# Patient Record
Sex: MALE | Race: WHITE | NOT HISPANIC OR LATINO | Employment: OTHER | URBAN - METROPOLITAN AREA
[De-identification: names, ages, dates, MRNs, and addresses within clinical notes are randomized per-mention and may not be internally consistent; named-entity substitution may affect disease eponyms.]

---

## 2013-03-25 LAB — CRC RECOMMENDATION EXT: NORMAL

## 2017-02-02 ENCOUNTER — PATIENT MESSAGE (OUTPATIENT)
Dept: NEUROLOGY | Facility: CLINIC | Age: 62
End: 2017-02-02

## 2017-03-01 PROBLEM — J34.89 RHINORRHEA: Status: ACTIVE | Noted: 2017-03-01

## 2017-03-01 PROBLEM — H57.9 ITCHY EYES: Status: ACTIVE | Noted: 2017-03-01

## 2017-03-01 PROBLEM — J34.89 SINUS PRESSURE: Status: ACTIVE | Noted: 2017-03-01

## 2017-03-01 PROBLEM — R05.9 COUGH: Status: ACTIVE | Noted: 2017-03-01

## 2017-03-01 PROBLEM — R53.83 FATIGUE: Status: ACTIVE | Noted: 2017-03-01

## 2017-03-01 PROBLEM — R06.7 SNEEZING: Status: ACTIVE | Noted: 2017-03-01

## 2017-03-01 PROBLEM — R50.9 FEVER: Status: ACTIVE | Noted: 2017-03-01

## 2017-03-01 PROBLEM — R09.89 CHEST CONGESTION: Status: ACTIVE | Noted: 2017-03-01

## 2017-03-09 PROBLEM — R50.9 FEVER: Status: RESOLVED | Noted: 2017-03-01 | Resolved: 2017-03-09

## 2017-03-09 PROBLEM — H57.9 ITCHY EYES: Status: RESOLVED | Noted: 2017-03-01 | Resolved: 2017-03-09

## 2017-03-09 PROBLEM — J34.89 SINUS PRESSURE: Status: RESOLVED | Noted: 2017-03-01 | Resolved: 2017-03-09

## 2017-03-09 PROBLEM — R06.7 SNEEZING: Status: RESOLVED | Noted: 2017-03-01 | Resolved: 2017-03-09

## 2017-05-15 PROBLEM — R05.9 COUGH: Status: RESOLVED | Noted: 2017-03-01 | Resolved: 2017-05-15

## 2017-05-15 PROBLEM — J34.89 RHINORRHEA: Status: RESOLVED | Noted: 2017-03-01 | Resolved: 2017-05-15

## 2017-05-15 PROBLEM — R09.89 CHEST CONGESTION: Status: RESOLVED | Noted: 2017-03-01 | Resolved: 2017-05-15

## 2018-02-09 PROBLEM — R53.83 FATIGUE: Status: RESOLVED | Noted: 2017-03-01 | Resolved: 2018-02-09

## 2020-03-02 ENCOUNTER — HOSPITAL ENCOUNTER (OUTPATIENT)
Dept: RADIOLOGY | Facility: HOSPITAL | Age: 65
Discharge: HOME | End: 2020-03-02
Attending: NURSE PRACTITIONER
Payer: COMMERCIAL

## 2020-03-02 DIAGNOSIS — M54.42 CHRONIC LEFT-SIDED LOW BACK PAIN WITH LEFT-SIDED SCIATICA: ICD-10-CM

## 2020-03-02 DIAGNOSIS — G89.29 CHRONIC LEFT-SIDED LOW BACK PAIN WITH LEFT-SIDED SCIATICA: ICD-10-CM

## 2020-03-02 DIAGNOSIS — M25.69 DECREASED ROM OF TRUNK AND BACK: ICD-10-CM

## 2020-03-02 DIAGNOSIS — R29.898 WEAKNESS OF LEFT LOWER EXTREMITY: ICD-10-CM

## 2020-03-02 PROCEDURE — 72148 MRI LUMBAR SPINE W/O DYE: CPT | Mod: 26,,, | Performed by: RADIOLOGY

## 2020-03-02 PROCEDURE — 72148 MRI LUMBAR SPINE WITHOUT CONTRAST: ICD-10-PCS | Mod: 26,,, | Performed by: RADIOLOGY

## 2020-03-02 PROCEDURE — 72148 MRI LUMBAR SPINE W/O DYE: CPT | Mod: TC

## 2020-06-03 PROBLEM — M51.36 DDD (DEGENERATIVE DISC DISEASE), LUMBAR: Status: ACTIVE | Noted: 2020-06-03

## 2020-06-03 PROBLEM — J30.2 SEASONAL ALLERGIES: Status: ACTIVE | Noted: 2020-06-03

## 2020-08-14 ENCOUNTER — HOSPITAL ENCOUNTER (OUTPATIENT)
Dept: RADIOLOGY | Facility: HOSPITAL | Age: 65
Discharge: HOME | End: 2020-08-14
Attending: NURSE PRACTITIONER
Payer: COMMERCIAL

## 2020-08-14 DIAGNOSIS — M51.36 DDD (DEGENERATIVE DISC DISEASE), LUMBAR: ICD-10-CM

## 2020-08-14 DIAGNOSIS — G95.9 SPINAL CORD LESION: ICD-10-CM

## 2020-08-14 DIAGNOSIS — M54.42 CHRONIC LEFT-SIDED LOW BACK PAIN WITH LEFT-SIDED SCIATICA: ICD-10-CM

## 2020-08-14 DIAGNOSIS — G89.29 CHRONIC LEFT-SIDED LOW BACK PAIN WITH LEFT-SIDED SCIATICA: ICD-10-CM

## 2020-08-14 PROCEDURE — 72158 MRI LUMBAR SPINE W WO CONTRAST: ICD-10-PCS | Mod: 26,,, | Performed by: RADIOLOGY

## 2020-08-14 PROCEDURE — A9585 GADOBUTROL INJECTION: HCPCS | Performed by: NURSE PRACTITIONER

## 2020-08-14 PROCEDURE — 72158 MRI LUMBAR SPINE W/O & W/DYE: CPT | Mod: TC

## 2020-08-14 PROCEDURE — 72149 MRI LUMBAR SPINE W/DYE: CPT | Mod: TC

## 2020-08-14 PROCEDURE — 72158 MRI LUMBAR SPINE W/O & W/DYE: CPT | Mod: 26,,, | Performed by: RADIOLOGY

## 2020-08-14 PROCEDURE — 25500020 PHARM REV CODE 255: Performed by: NURSE PRACTITIONER

## 2020-08-14 RX ORDER — GADOBUTROL 604.72 MG/ML
9 INJECTION INTRAVENOUS
Status: COMPLETED | OUTPATIENT
Start: 2020-08-14 | End: 2020-08-14

## 2020-08-14 RX ADMIN — GADOBUTROL 9 ML: 604.72 INJECTION INTRAVENOUS at 12:08

## 2020-08-14 RX ADMIN — GADOBUTROL 9 ML: 604.72 INJECTION INTRAVENOUS at 11:08

## 2020-09-04 ENCOUNTER — TELEPHONE (OUTPATIENT)
Dept: SURGERY | Facility: CLINIC | Age: 65
End: 2020-09-04

## 2020-09-18 ENCOUNTER — HOSPITAL ENCOUNTER (OUTPATIENT)
Dept: RADIOLOGY | Facility: HOSPITAL | Age: 65
Discharge: HOME | End: 2020-09-18
Attending: INTERNAL MEDICINE
Payer: COMMERCIAL

## 2020-09-18 PROCEDURE — 72141 MRI CERVICAL SPINE WITHOUT CONTRAST: ICD-10-PCS | Mod: 26,,, | Performed by: RADIOLOGY

## 2020-09-18 PROCEDURE — 76856 US EXAM PELVIC COMPLETE: CPT | Mod: TC

## 2020-09-18 PROCEDURE — 76856 US EXAM PELVIC COMPLETE: CPT | Mod: 26,,, | Performed by: RADIOLOGY

## 2020-09-18 PROCEDURE — 72141 MRI NECK SPINE W/O DYE: CPT | Mod: 26,,, | Performed by: RADIOLOGY

## 2020-09-18 PROCEDURE — 76856 US PELVIS COMPLETE NON OB: ICD-10-PCS | Mod: 26,,, | Performed by: RADIOLOGY

## 2020-09-18 PROCEDURE — 72141 MRI NECK SPINE W/O DYE: CPT | Mod: TC

## 2020-09-18 PROCEDURE — 76700 US EXAM ABDOM COMPLETE: CPT | Mod: 26,,, | Performed by: RADIOLOGY

## 2020-09-18 PROCEDURE — 76700 US EXAM ABDOM COMPLETE: CPT | Mod: TC

## 2020-09-18 PROCEDURE — 76700 US ABDOMEN COMPLETE: ICD-10-PCS | Mod: 26,,, | Performed by: RADIOLOGY

## 2020-09-21 ENCOUNTER — OFFICE VISIT (OUTPATIENT)
Dept: SURGERY | Facility: CLINIC | Age: 65
End: 2020-09-21
Payer: COMMERCIAL

## 2020-09-21 VITALS
RESPIRATION RATE: 18 BRPM | HEIGHT: 69 IN | WEIGHT: 220 LBS | DIASTOLIC BLOOD PRESSURE: 97 MMHG | OXYGEN SATURATION: 98 % | HEART RATE: 70 BPM | BODY MASS INDEX: 32.58 KG/M2 | SYSTOLIC BLOOD PRESSURE: 150 MMHG | TEMPERATURE: 98 F

## 2020-09-21 DIAGNOSIS — K40.90 RIGHT INGUINAL HERNIA: Primary | ICD-10-CM

## 2020-09-21 DIAGNOSIS — E78.2 MIXED HYPERLIPIDEMIA: ICD-10-CM

## 2020-09-21 PROCEDURE — 99204 OFFICE O/P NEW MOD 45 MIN: CPT | Mod: S$GLB,,, | Performed by: SURGERY

## 2020-09-21 PROCEDURE — 3008F PR BODY MASS INDEX (BMI) DOCUMENTED: ICD-10-PCS | Mod: S$GLB,,, | Performed by: SURGERY

## 2020-09-21 PROCEDURE — 3008F BODY MASS INDEX DOCD: CPT | Mod: S$GLB,,, | Performed by: SURGERY

## 2020-09-21 PROCEDURE — 99204 PR OFFICE/OUTPT VISIT, NEW, LEVL IV, 45-59 MIN: ICD-10-PCS | Mod: S$GLB,,, | Performed by: SURGERY

## 2020-09-21 NOTE — PROGRESS NOTES
Subjective:       Patient ID: David Duval Jr.     Chief Complaint:  Consult ([id number])      HPI:  Mr. Duval presents today as a consult from Dr. Choi.  He was sent in consultation for a possible right inguinal hernia.  He is concerned about a possible hernia because his father  from a strangulated inguinal hernia.  He noted a discomfort in his right groin about 1 year ago.  Since that time he has not noted any pain, bulging, nausea, vomiting, diarrhea, constipation, melena, hematochezia, hematemesis, change in bowel habits, change in stool characteristics, decreasing caliber stool, etc.  No other associated symptoms.  No aggravating or alleviating factors.  Colonoscopy in 2013 with no pathology other than diverticulosis detected.      Allergies & Meds:  Review of patient's allergies indicates:  No Known Allergies    Current Outpatient Medications   Medication Sig Dispense Refill    ALPRAZolam (XANAX) 0.25 MG tablet Take 1 tablet (0.25 mg total) by mouth daily as needed for Anxiety (Take 30 mins to 1 hour prior to MRI). 2 tablet 0    ALPRAZolam (XANAX) 0.25 MG tablet Take 1 tablet (0.25 mg total) by mouth 2 (two) times daily as needed for Anxiety. 2 tablet 0    aspirin 81 MG Chew Take 81 mg by mouth once daily.      atorvastatin (LIPITOR) 10 MG tablet Take 1 tablet (10 mg total) by mouth nightly. 90 tablet 3    azelastine-fluticasone (DYMISTA) 137-50 mcg/spray Spry nassal spray 1 spray by Each Nostril route 2 (two) times daily. 23 g 6    brimonidine 0.15 % OPTH DROP (ALPHAGAN) 0.15 % ophthalmic solution INSTILL 1 DROP IN BOTH EYES BID  0    donepezil (ARICEPT) 10 MG tablet Take 10 mg by mouth once daily.   1    dorzolamide (TRUSOPT) 2 % ophthalmic solution Place 1 drop into both eyes 2 (two) times daily.      memantine (NAMENDA XR) 28 mg CSpX Take 28 mg by mouth once daily.   1    montelukast (SINGULAIR) 10 mg tablet Take 1 tablet (10 mg total) by mouth daily as needed. 90  tablet 2    multivitamin (ONE DAILY MULTIVITAMIN) per tablet Take 1 tablet by mouth once daily.      travoprost, benzalkonium, (TRAVATAN) 0.004 % ophthalmic solution Place 1 drop into both eyes every evening.       No current facility-administered medications for this visit.        PMFSHx:  Past Medical History:   Diagnosis Date    GERD (gastroesophageal reflux disease)     Hyperlipemia        Past Surgical History:   Procedure Laterality Date    COLONOSCOPY  3/15/2013    Diverticulosis  Dr. Harley       History reviewed. No pertinent family history.    Social History     Tobacco Use    Smoking status: Never Smoker   Substance Use Topics    Alcohol use: No    Drug use: No       Review of Systems   Constitutional: Negative for appetite change, chills, fatigue, fever and unexpected weight change.   HENT: Negative for congestion, dental problem, ear pain, mouth sores, postnasal drip, rhinorrhea, sore throat, tinnitus, trouble swallowing and voice change.    Eyes: Negative for photophobia, pain, discharge and visual disturbance.   Respiratory: Negative for cough, chest tightness, shortness of breath and wheezing.    Cardiovascular: Negative for chest pain, palpitations and leg swelling.   Gastrointestinal: Negative for abdominal pain, blood in stool, constipation, diarrhea, nausea and vomiting.   Endocrine: Negative for cold intolerance, heat intolerance, polydipsia, polyphagia and polyuria.   Genitourinary: Negative for difficulty urinating, dysuria, flank pain, frequency, hematuria and urgency.   Musculoskeletal: Negative for arthralgias, joint swelling and myalgias.   Skin: Negative for color change and rash.   Allergic/Immunologic: Negative for immunocompromised state.   Neurological: Negative for dizziness, tremors, seizures, syncope, speech difficulty, weakness, numbness and headaches.   Hematological: Negative for adenopathy. Does not bruise/bleed easily.   Psychiatric/Behavioral: Negative for agitation,  confusion, hallucinations, self-injury and suicidal ideas. The patient is not nervous/anxious.             Physical Exam  Constitutional:       General: He is not in acute distress.     Appearance: Normal appearance. He is well-developed. He is not ill-appearing or toxic-appearing.      Comments: Body mass index is 32.49 kg/m².     HENT:      Head: Normocephalic and atraumatic. No contusion.      Right Ear: Hearing and external ear normal. No drainage or tenderness.      Left Ear: Hearing and external ear normal. No drainage or tenderness.      Nose: Nose normal. No rhinorrhea.   Eyes:      General: Lids are normal.         Right eye: No discharge.         Left eye: No discharge.      Conjunctiva/sclera: Conjunctivae normal.      Right eye: Right conjunctiva is not injected. No exudate.     Left eye: Left conjunctiva is not injected. No exudate.     Pupils: Pupils are equal, round, and reactive to light.   Neck:      Musculoskeletal: Normal range of motion. No edema.      Thyroid: No thyroid mass or thyromegaly.      Vascular: No carotid bruit or JVD.      Trachea: Trachea and phonation normal. No tracheal deviation.   Cardiovascular:      Rate and Rhythm: Normal rate and regular rhythm.      Chest Wall: PMI is not displaced.      Heart sounds: Normal heart sounds, S1 normal and S2 normal. No murmur. No friction rub. No gallop.    Pulmonary:      Effort: Pulmonary effort is normal. No tachypnea, accessory muscle usage, respiratory distress or retractions.      Breath sounds: Normal breath sounds.   Chest:      Chest wall: No mass, tenderness or crepitus.      Breasts: Breasts are symmetrical.         Right: No inverted nipple, mass, nipple discharge or skin change.         Left: No inverted nipple, mass, nipple discharge or skin change.   Abdominal:      General: Bowel sounds are normal. There is no distension or abdominal bruit.      Palpations: Abdomen is soft. There is no fluid wave or mass.      Tenderness:  There is no abdominal tenderness.      Hernia: A hernia is present. Hernia is present in the right inguinal area (small, reducible). There is no hernia in the umbilical area, ventral area or left inguinal area.   Genitourinary:     Penis: Normal.       Scrotum/Testes: Normal.         Right: Mass or swelling not present.         Left: Mass or swelling not present.   Musculoskeletal:      Cervical back: Normal.      Thoracic back: Normal.      Lumbar back: Normal.      Right upper arm: Normal.      Left upper arm: Normal.      Right forearm: Normal.      Left forearm: Normal.      Right hand: Normal.      Left hand: Normal.      Right upper leg: Normal.      Left upper leg: Normal.      Right lower leg: Normal.      Left lower leg: Normal.      Right foot: Normal.      Left foot: Normal.   Lymphadenopathy:      Head:      Right side of head: No submental or submandibular adenopathy.      Left side of head: No submental or submandibular adenopathy.      Cervical: No cervical adenopathy.      Upper Body:      Right upper body: No supraclavicular adenopathy.      Left upper body: No supraclavicular adenopathy.      Lower Body: No right inguinal adenopathy. No left inguinal adenopathy.   Skin:     General: Skin is warm and dry.      Findings: No lesion or rash.      Nails: There is no clubbing.     Neurological:      Mental Status: He is alert and oriented to person, place, and time. He is not disoriented.      GCS: GCS eye subscore is 4. GCS verbal subscore is 5. GCS motor subscore is 6.      Cranial Nerves: No cranial nerve deficit.      Sensory: No sensory deficit.      Motor: No tremor or atrophy.      Coordination: Coordination normal.      Gait: Gait normal.   Psychiatric:         Attention and Perception: He is attentive.         Speech: Speech normal.         Behavior: Behavior normal.         Thought Content: Thought content normal.             Medical Records Review:  Pelvic ultrasound films and report reviewed  from 9/2/2020.  No evidence of a hernia detected on the ultrasound however the groins do not appear to have been evaluated and there was no Valsalva maneuver performed.  He does have a enlarged prostate.    Assessment:         1. Right inguinal hernia    2. Mixed hyperlipidemia          Plan:     Right inguinal hernia    Mixed hyperlipidemia  -     Ambulatory referral/consult to General Surgery        Follow up in about 3 months (around 12/21/2020).    Counseling/Medical Decision Making:  David Alvarezmary Duval Jr. was informed of the results of his evaluation thus far and the differential diagnosis.   All treatment options as well as the risks, benefits, possible complications, details up, and indications for each option were also discussed in great detail.   Diagnoses discussed included but were not limited to: indirect inguinal hernia, direct inguinal hernia, femoral hernia, sliding hernia, groin strain, lymphadenopathy, etc.   Options discussed included but were not limited to: laparoscopic hernia surgery, conventional hernia surgery with or without mesh, symptomatic treatment, hernia belt or truss, second opinion, referral elsewhere for treatment, observation, etc.   Possible complications of surgery discussed included but were not limited to: bleeding, infections, recurrence of the hernia(s), scar, chronic pain, nerve damage, scrotal numbness, thigh numbness, impotence, infertility, loss of testicles, need for additional operations or procedures, etc.   Possible complication of an unrepaired hernia discussed included but were not limited to: incarceration, strangulation, necrotic bowel, gangrene, death, bowel obstruction, etc.   Questions were solicited and answered.   Entire conversation was held in layman's terms.   [employer] publication on hernias was provided.   I read the entire operative consent form to him verbatim.   A copy of the consent form was provided for his review outside the clinic and hospital prior  to the procedure.   All unfamiliar terms were to clearly defined.   At the conclusion of the conversation he voiced complete understanding of all we discussed, satisfaction that all questions were answered, and that he felt fully informed and completely capable of making an informed decision.   He desires and requests to proceed with conventional hernia surgery with or without mesh as clinically indicated in January of 2021.  No evident contraindication to to deferring surgery until January as desired.    Total face-to-face encounter time was 45 minutes, 25 minutes spent counseling as outlined/summarized above         The above order for ambulatory referral/consult to General surgery is in the plan section of my note incorrectly.  This is the order from another provider that precipitated this office visit/encounter.  Currently there is an Epic software error that automatically pulls the referral diagnosis from the requesting provider into the plan section of this encounter note.  This order should be disregarded as part of this note/encounter.

## 2020-09-21 NOTE — LETTER
September 21, 2020      Prabhakar Choi III, MD  [address]  [facility] [place] [address]           [facility] - General Surgery  [address] [facility] [place] [address]  Phone: [phone]  Fax: [phone]          Patient: David Duval Jr.   MR Number: [id number]   YOB: 1955   Date of Visit: 9/21/2020       Dear Dr. Prabhakar Choi III:    Thank you for referring David Duval to me for evaluation. Attached you will find relevant portions of my assessment and plan of care.    If you have questions, please do not hesitate to call me. I look forward to following David Duval along with you.    Sincerely,    Prateek White MD    Enclosure  CC:  No Recipients    If you would like to receive this communication electronically, please contact [email or web address] or [phone] to request more information on [employer] access.    For providers and/or their staff who would like to refer a patient to [facility], please contact us through our one-stop-shop provider referral line, [facility], at [phone].    If you feel you have received this communication in error or would no longer like to receive these types of communications, please e-mail [email or web address]

## 2020-09-24 ENCOUNTER — TELEPHONE (OUTPATIENT)
Dept: PHYSICAL MEDICINE AND REHAB | Facility: CLINIC | Age: 65
End: 2020-09-24

## 2020-10-13 ENCOUNTER — OFFICE VISIT (OUTPATIENT)
Dept: PHYSICAL MEDICINE AND REHAB | Facility: CLINIC | Age: 65
End: 2020-10-13
Payer: COMMERCIAL

## 2020-10-13 ENCOUNTER — HOSPITAL ENCOUNTER (OUTPATIENT)
Dept: RADIOLOGY | Facility: HOSPITAL | Age: 65
Discharge: HOME | End: 2020-10-13
Attending: PHYSICAL MEDICINE & REHABILITATION
Payer: COMMERCIAL

## 2020-10-13 VITALS
BODY MASS INDEX: 32.58 KG/M2 | RESPIRATION RATE: 15 BRPM | HEART RATE: 72 BPM | DIASTOLIC BLOOD PRESSURE: 98 MMHG | WEIGHT: 220 LBS | SYSTOLIC BLOOD PRESSURE: 155 MMHG | HEIGHT: 69 IN

## 2020-10-13 DIAGNOSIS — M79.641 PAIN OF RIGHT HAND: Primary | ICD-10-CM

## 2020-10-13 DIAGNOSIS — M79.641 PAIN OF RIGHT HAND: ICD-10-CM

## 2020-10-13 PROCEDURE — 73120 X-RAY EXAM OF HAND: CPT | Mod: TC,FY,RT

## 2020-10-13 PROCEDURE — 99999 PR PBB SHADOW E&M-EST. PATIENT-LVL IV: CPT | Mod: PBBFAC,,, | Performed by: PHYSICAL MEDICINE & REHABILITATION

## 2020-10-13 PROCEDURE — 99204 OFFICE O/P NEW MOD 45 MIN: CPT | Mod: S$GLB,,, | Performed by: PHYSICAL MEDICINE & REHABILITATION

## 2020-10-13 PROCEDURE — 73120 XR HAND 2 VIEW RIGHT: ICD-10-PCS | Mod: 26,RT,, | Performed by: RADIOLOGY

## 2020-10-13 PROCEDURE — 99999 PR PBB SHADOW E&M-EST. PATIENT-LVL IV: ICD-10-PCS | Mod: PBBFAC,,, | Performed by: PHYSICAL MEDICINE & REHABILITATION

## 2020-10-13 PROCEDURE — 73120 X-RAY EXAM OF HAND: CPT | Mod: 26,RT,, | Performed by: RADIOLOGY

## 2020-10-13 PROCEDURE — 99204 PR OFFICE/OUTPT VISIT, NEW, LEVL IV, 45-59 MIN: ICD-10-PCS | Mod: S$GLB,,, | Performed by: PHYSICAL MEDICINE & REHABILITATION

## 2020-10-13 NOTE — PROGRESS NOTES
HPI:  Patient is a 64 y.o. year old male w. Right hand pain. He has been having difficulty grasping his coffee cup because of  Weakness. He noticed this over 3 months ago. He states the base of his thumb is the most painful. The pain comes and goes. It does not happen at night while he is resting his hand. He does complain of weakness. His mri of the cervical spine ordered by his pcp did show abnormalities, see below.  He does admit to nerve injury to his left hand when he was 5 yrs old, after falling on a broken soda bottle. This left him w. A weak hand on his left side.  However, he has never had problems of his right side until now.    Imaging  MRI Cervical Spine Without Contrast  Order: [id number]  Status:  Final result   Visible to patient:  Yes (Patient Portal) Next appt:  Today at 10:30 AM in Radiology (Portable X-Ray) Dx:  Other spondylosis, cervical region  Details    Reading Physician Reading Date Result Priority   Bryan Love MD  [phone] 9/18/2020 Routine      Narrative & Impression     EXAMINATION:  MRI CERVICAL SPINE WITHOUT CONTRAST     CLINICAL HISTORY:  Cervical osteoarthritis;right upper ext weakness;.  Other spondylosis, cervical region     TECHNIQUE:  Multiplanar, multisequence MR images of the cervical spine were acquired without the administration of contrast.     COMPARISON:  None.     FINDINGS:  Mild straightening the normal cervical lordosis.  Minimal grade 1 retrolisthesis of C6 on C7.  Remaining cervical vertebral bodies are normal in height and alignment.  No acute fracture.  No marrow edema.     Spinal cord is normal in course and signal intensity.  No MRI evidence for marrow edema or myelomalacia.     No significant prevertebral soft tissue swelling.  Paraspinal soft tissues are unremarkable.     C2-C3: Asymmetric uncovertebral and facet joint hypertrophy results in mild narrowing of the right neural foramen and moderate narrowing the left neural foramen.  No central spinal  canal stenosis.     C3-C4: Broad-based disc bulging with a small left paracentral focal disc protrusion.  This in association with asymmetric uncovertebral and facet joint hypertrophy results in moderate central spinal canal stenosis with moderate narrowing of the right neural foramen and severe narrowing of the left neural foramen.  Narrowed AP canal diameter measures 7.4 mm.     C4-C5: Broad-based disc bulging with uncovertebral and facet joint hypertrophy results in mild central spinal canal stenosis with moderate bilateral neural foraminal narrowing.  Narrowed AP canal diameter measures 9.2 mm.     C5-C6: Broad-based disc bulging with uncovertebral and facet joint hypertrophy results in mild central spinal canal stenosis with severe bilateral neural foraminal narrowing.  Narrowed AP canal diameter measures 9.3 mm.     C6-C7: Grade 1 retrolisthesis.  Broad-based disc bulging with uncovertebral and facet joint hypertrophy results in mild central spinal canal stenosis with severe bilateral neural foraminal narrowing.  Narrowed AP canal diameter measures 9.2 mm.     C7-T1: Uncovertebral and facet joint hypertrophy results in moderate narrowing of the right neural foramen and severe narrowing the left neural foramen.  No central spinal canal stenosis.     Impression:     1. Grade 1 retrolisthesis of C6 on C7.  2. Degenerative disc disease at C2-C3 resulting in mild to moderate neural foraminal narrowing.  3. Degenerative disc disease at C3-C4 with a left paracentral focal disc protrusion resulting in moderate central spinal canal stenosis with moderate to severe neural foraminal narrowing.  4. Multilevel degenerative disc disease from C4 through C7 resulting in mild central spinal canal stenosis with moderate to severe neural foraminal narrowing.  5. Degenerative disc disease at C7-T1 resulting in moderate to severe neural foraminal narrowing.           Labs  egfr cr lfts gluc nl      Past Medical History:    Diagnosis Date    GERD (gastroesophageal reflux disease)     Hyperlipemia      Past Surgical History:   Procedure Laterality Date    COLONOSCOPY  3/15/2013    Diverticulosis  Dr. Harley     No family history on file.  Social History     Socioeconomic History    Marital status:      Spouse name: Not on file    Number of children: Not on file    Years of education: Not on file    Highest education level: Not on file   Occupational History    Not on file   Social Needs    Financial resource strain: Not on file    Food insecurity     Worry: Not on file     Inability: Not on file    Transportation needs     Medical: Not on file     Non-medical: Not on file   Tobacco Use    Smoking status: Never Smoker   Substance and Sexual Activity    Alcohol use: No    Drug use: No    Sexual activity: Not on file   Lifestyle    Physical activity     Days per week: Not on file     Minutes per session: Not on file    Stress: Not on file   Relationships    Social connections     Talks on phone: Not on file     Gets together: Not on file     Attends Yazidism service: Not on file     Active member of club or organization: Not on file     Attends meetings of clubs or organizations: Not on file     Relationship status: Not on file   Other Topics Concern    Not on file   Social History Narrative    Not on file       Review of patient's allergies indicates:  No Known Allergies    Current Outpatient Medications:     ALPRAZolam (XANAX) 0.25 MG tablet, Take 1 tablet (0.25 mg total) by mouth daily as needed for Anxiety (Take 30 mins to 1 hour prior to MRI)., Disp: 2 tablet, Rfl: 0    aspirin 81 MG Chew, Take 81 mg by mouth once daily., Disp: , Rfl:     atorvastatin (LIPITOR) 10 MG tablet, Take 1 tablet (10 mg total) by mouth nightly., Disp: 90 tablet, Rfl: 3    azelastine-fluticasone (DYMISTA) 137-50 mcg/spray Spry nassal spray, 1 spray by Each Nostril route 2 (two) times daily., Disp: 23 g, Rfl: 6    brimonidine  0.15 % OPTH DROP (ALPHAGAN) 0.15 % ophthalmic solution, INSTILL 1 DROP IN BOTH EYES BID, Disp: , Rfl: 0    donepezil (ARICEPT) 10 MG tablet, Take 10 mg by mouth once daily. , Disp: , Rfl: 1    dorzolamide (TRUSOPT) 2 % ophthalmic solution, Place 1 drop into both eyes 2 (two) times daily., Disp: , Rfl:     memantine (NAMENDA XR) 28 mg CSpX, Take 28 mg by mouth once daily. , Disp: , Rfl: 1    montelukast (SINGULAIR) 10 mg tablet, Take 1 tablet (10 mg total) by mouth daily as needed., Disp: 90 tablet, Rfl: 2    multivitamin (ONE DAILY MULTIVITAMIN) per tablet, Take 1 tablet by mouth once daily., Disp: , Rfl:     travoprost, benzalkonium, (TRAVATAN) 0.004 % ophthalmic solution, Place 1 drop into both eyes every evening., Disp: , Rfl:       Review of Systems:    No nausea, vomiting, fevers, chills , contipation, diarrhea or sweats,no weight change, occ neck stiffness, no chest pain, no sob, no change of bowel or bladder habits,no coordination issues      Physical Exam:      Vitals:    10/13/20 0919   BP: (!) 155/98   Pulse: 72   Resp: 15     alert and oriented ×4 follows commands answers all questions appropriately,affect wnl  Manual muscle test 5 out of 5 except for left HI and opponens pollicis on left hand (baseline from nerve injury as a child) sensation to light touch grossly intact  -CMC provocation  Nl gait  -spurling's  -guillermo's  Full cervical ROM  babinsky down  No clonus  DTR's symmetric 2+  No C/C/E  -finkelstein's  No muscular atrophy    Assessment:  Hand/thumb pain d/t cervical radic/ vs peripheral nerve entrapment vs CMC OA (less likely)  Cervical spondylosis w. Severe neuroforaminal narrowing at multiple levels    Plan:  Will order an EMG/NCS of the RUE   He will also get a right hand xray  rtc 2wks    Thank you for this interesting referral-note will be sent via Epic to referring provider (Dr. Choi)

## 2020-10-27 ENCOUNTER — OFFICE VISIT (OUTPATIENT)
Dept: PHYSICAL MEDICINE AND REHAB | Facility: CLINIC | Age: 65
End: 2020-10-27
Payer: COMMERCIAL

## 2020-10-27 VITALS
BODY MASS INDEX: 32.58 KG/M2 | HEIGHT: 69 IN | SYSTOLIC BLOOD PRESSURE: 151 MMHG | DIASTOLIC BLOOD PRESSURE: 96 MMHG | WEIGHT: 220 LBS

## 2020-10-27 DIAGNOSIS — M25.541 ARTHRALGIA OF RIGHT HAND: Primary | ICD-10-CM

## 2020-10-27 DIAGNOSIS — M79.641 PAIN OF RIGHT HAND: ICD-10-CM

## 2020-10-27 PROCEDURE — 99212 PR OFFICE/OUTPT VISIT, EST, LEVL II, 10-19 MIN: ICD-10-PCS | Mod: S$GLB,,, | Performed by: PHYSICAL MEDICINE & REHABILITATION

## 2020-10-27 PROCEDURE — 3008F BODY MASS INDEX DOCD: CPT | Mod: CPTII,S$GLB,, | Performed by: PHYSICAL MEDICINE & REHABILITATION

## 2020-10-27 PROCEDURE — 95886 MUSC TEST DONE W/N TEST COMP: CPT | Mod: S$GLB,,, | Performed by: PHYSICAL MEDICINE & REHABILITATION

## 2020-10-27 PROCEDURE — 95910 NRV CNDJ TEST 7-8 STUDIES: CPT | Mod: S$GLB,,, | Performed by: PHYSICAL MEDICINE & REHABILITATION

## 2020-10-27 PROCEDURE — 99499 NO LOS: ICD-10-PCS | Mod: S$GLB,,, | Performed by: PHYSICAL MEDICINE & REHABILITATION

## 2020-10-27 PROCEDURE — 95886 PR EMG COMPLETE, W/ NERVE CONDUCTION STUDIES, 5+ MUSCLES: ICD-10-PCS | Mod: S$GLB,,, | Performed by: PHYSICAL MEDICINE & REHABILITATION

## 2020-10-27 PROCEDURE — 99499 UNLISTED E&M SERVICE: CPT | Mod: S$GLB,,, | Performed by: PHYSICAL MEDICINE & REHABILITATION

## 2020-10-27 PROCEDURE — 3008F PR BODY MASS INDEX (BMI) DOCUMENTED: ICD-10-PCS | Mod: CPTII,S$GLB,, | Performed by: PHYSICAL MEDICINE & REHABILITATION

## 2020-10-27 PROCEDURE — 99999 PR PBB SHADOW E&M-EST. PATIENT-LVL III: ICD-10-PCS | Mod: PBBFAC,,, | Performed by: PHYSICAL MEDICINE & REHABILITATION

## 2020-10-27 PROCEDURE — 95910 PR NERVE CONDUCTION STUDY; 7-8 STUDIES: ICD-10-PCS | Mod: S$GLB,,, | Performed by: PHYSICAL MEDICINE & REHABILITATION

## 2020-10-27 PROCEDURE — 99212 OFFICE O/P EST SF 10 MIN: CPT | Mod: S$GLB,,, | Performed by: PHYSICAL MEDICINE & REHABILITATION

## 2020-10-27 PROCEDURE — 99999 PR PBB SHADOW E&M-EST. PATIENT-LVL III: CPT | Mod: PBBFAC,,, | Performed by: PHYSICAL MEDICINE & REHABILITATION

## 2020-10-27 RX ORDER — DICLOFENAC SODIUM 10 MG/G
4 GEL TOPICAL 3 TIMES DAILY
Qty: 2 TUBE | Refills: 6 | Status: SHIPPED | OUTPATIENT
Start: 2020-10-27 | End: 2023-04-12

## 2020-10-27 NOTE — PROGRESS NOTES
[facility]  Physical Medicine and Rehabilitation   [address] [place] [address], [address]  [place] [address]             Patient: David Duval   Patient ID: [id number]   Sex:     Date of Birth:     Age:     Notes:     Last visit date: 10/27/2020         Visit date and time: 10/27/2020 07:10   Patient Age on Visit Date:     Referring Physician:     Diagnoses:         Right thumb pain  Sensory NCS      Nerve / Sites Rec. Site Onset Lat Peak Lat Ref. NP Amp Ref. PP Amp Ref. Segments Distance Velocity     ms ms ms µV µV µV µV  cm m/s   R Median - Digit II (Antidromic)      Wrist Dig II 2.76 3.44 ?3.40 8.9 ?15.0 13.4 ?20.0 Wrist - Dig II 13 47   R Ulnar - Digit V (Antidromic)      Wrist Dig V 2.08 2.97 ?3.10 13.4 ?10.0 19.4 ?15.0 Wrist - Dig V 11 53       Motor NCS      Nerve / Sites Muscle Latency Ref. Amplitude Ref. Amp % Duration Segments Distance Lat Diff Velocity Ref.     ms ms mV mV % ms  cm ms m/s m/s   R Median - APB      Wrist APB 2.92 ?4.40 11.2 ?4.0 100 7.14 Wrist - APB 7         Elbow APB 8.39  5.8  51.9 6.67 Elbow - Wrist 25 5.47 46 ?49   R Ulnar - ADM      Wrist ADM 2.60 ?3.60 6.8 ?5.0 100 7.08 Wrist - ADM 7         B.Elbow ADM 7.14  6.6  96.9 8.23 B.Elbow - Wrist 24.5 4.53 54 ?49      A.Elbow ADM 9.17  6.4  94.5 8.23 A.Elbow - B.Elbow 10 2.03 49 ?49       EMG Summary Table     Spontaneous MUAP Recruitment   Muscle IA Fib PSW Fasc H.F. Amp Dur. PPP Pattern   R. Biceps brachii N None None None None N N N N   R. Deltoid N None None None None N N N N   R. Triceps brachii N None None None None N N N N   R. Extensor carpi radialis brevis N None None None None N N N N   R. First dorsal interosseous N None None None None N N N N       Summary    The motor conduction test was performed on 2 nerve(s). The results were normal in 1 nerve(s): R Ulnar - ADM. Results outside the specified normal range were found in 1 nerve(s), as follows:   In the R Median - APB study  o the take off velocity result was  reduced for Elbow - Wrist segment    The sensory conduction test was performed on 2 nerve(s). The results were normal in 1 nerve(s): R Ulnar - Digit V (Antidromic). Results outside the specified normal range were found in 1 nerve(s), as follows:   In the R Median - Digit II (Antidromic) study  o the peak latency result was increased for Wrist stimulation  o the peak amplitude result was reduced for Wrist stimulation    The needle EMG study was normal in all 5 tested muscles: R. Biceps brachii, R. Deltoid, R. Triceps brachii, R. Extensor carpi radialis brevis, R. First dorsal interosseous.          Conclusion:     Moderate right carpal tunnel syndrome  NO electrophysiologic evidence of cervical radiculopathy          ____________________________  Lilly Betancourt D.O.

## 2020-10-27 NOTE — PROGRESS NOTES
HPI:  Patient is a 64 y.o. year old male w. Right mod cts as diagnosed via EMG. His xray of his hand showed mild djd. He has discomfort when picking up a large dringing jug, otherwise he is asymptomatic. He has occasional neck pain.    Imaging  X-Ray Hand 2 View Right  Order: [id number]  Status:  Final result   Visible to patient:  Yes (Patient Portal)   Next appt:  11/24/2020 at 08:45 AM in Internal Medicine (Prabhakar Choi III, MD)   Dx:  Pain of right hand  Details    Reading Physician Reading Date Result Priority   Hyacinth Ocampo MD  [phone] 10/13/2020 Routine      Narrative & Impression     EXAMINATION:  XR HAND 2 VIEW RIGHT     CLINICAL HISTORY:  Pain in right hand     TECHNIQUE:  Two views AP and lateral of the right hand     COMPARISON:  None     FINDINGS:  The right hand is visualized on the two views appears intact without acute fracture or dislocation.  Mild degenerative changes more so at the carpal 1st metacarpal joint.     Impression:     As above.         MRI Cervical Spine Without Contrast  Order: [id number]  Status:  Final result   Visible to patient:  Yes (Patient Portal)   Next appt:  11/24/2020 at 08:45 AM in Internal Medicine (Prabhakar Choi III, MD)   Dx:  Other spondylosis, cervical region  Details    Reading Physician Reading Date Result Priority   Bryan Love MD  [phone] 9/18/2020 Routine      Narrative & Impression     EXAMINATION:  MRI CERVICAL SPINE WITHOUT CONTRAST     CLINICAL HISTORY:  Cervical osteoarthritis;right upper ext weakness;.  Other spondylosis, cervical region     TECHNIQUE:  Multiplanar, multisequence MR images of the cervical spine were acquired without the administration of contrast.     COMPARISON:  None.     FINDINGS:  Mild straightening the normal cervical lordosis.  Minimal grade 1 retrolisthesis of C6 on C7.  Remaining cervical vertebral bodies are normal in height and alignment.  No acute fracture.  No marrow edema.     Spinal cord is normal in course  and signal intensity.  No MRI evidence for marrow edema or myelomalacia.     No significant prevertebral soft tissue swelling.  Paraspinal soft tissues are unremarkable.     C2-C3: Asymmetric uncovertebral and facet joint hypertrophy results in mild narrowing of the right neural foramen and moderate narrowing the left neural foramen.  No central spinal canal stenosis.     C3-C4: Broad-based disc bulging with a small left paracentral focal disc protrusion.  This in association with asymmetric uncovertebral and facet joint hypertrophy results in moderate central spinal canal stenosis with moderate narrowing of the right neural foramen and severe narrowing of the left neural foramen.  Narrowed AP canal diameter measures 7.4 mm.     C4-C5: Broad-based disc bulging with uncovertebral and facet joint hypertrophy results in mild central spinal canal stenosis with moderate bilateral neural foraminal narrowing.  Narrowed AP canal diameter measures 9.2 mm.     C5-C6: Broad-based disc bulging with uncovertebral and facet joint hypertrophy results in mild central spinal canal stenosis with severe bilateral neural foraminal narrowing.  Narrowed AP canal diameter measures 9.3 mm.     C6-C7: Grade 1 retrolisthesis.  Broad-based disc bulging with uncovertebral and facet joint hypertrophy results in mild central spinal canal stenosis with severe bilateral neural foraminal narrowing.  Narrowed AP canal diameter measures 9.2 mm.     C7-T1: Uncovertebral and facet joint hypertrophy results in moderate narrowing of the right neural foramen and severe narrowing the left neural foramen.  No central spinal canal stenosis.     Impression:     1. Grade 1 retrolisthesis of C6 on C7.  2. Degenerative disc disease at C2-C3 resulting in mild to moderate neural foraminal narrowing.  3. Degenerative disc disease at C3-C4 with a left paracentral focal disc protrusion resulting in moderate central spinal canal stenosis with moderate to severe neural  foraminal narrowing.  4. Multilevel degenerative disc disease from C4 through C7 resulting in mild central spinal canal stenosis with moderate to severe neural foraminal narrowing.  5. Degenerative disc disease at C7-T1 resulting in moderate to severe neural foraminal narrowing.              Labs  egfr cr lfts gluc nl        Past Medical History:   Diagnosis Date    GERD (gastroesophageal reflux disease)     Hyperlipemia      Past Surgical History:   Procedure Laterality Date    COLONOSCOPY  3/15/2013    Diverticulosis  Dr. Harley     No family history on file.  Social History     Socioeconomic History    Marital status:      Spouse name: Not on file    Number of children: Not on file    Years of education: Not on file    Highest education level: Not on file   Occupational History    Not on file   Social Needs    Financial resource strain: Not on file    Food insecurity     Worry: Not on file     Inability: Not on file    Transportation needs     Medical: Not on file     Non-medical: Not on file   Tobacco Use    Smoking status: Never Smoker   Substance and Sexual Activity    Alcohol use: No    Drug use: No    Sexual activity: Not on file   Lifestyle    Physical activity     Days per week: Not on file     Minutes per session: Not on file    Stress: Not on file   Relationships    Social connections     Talks on phone: Not on file     Gets together: Not on file     Attends Latter day service: Not on file     Active member of club or organization: Not on file     Attends meetings of clubs or organizations: Not on file     Relationship status: Not on file   Other Topics Concern    Not on file   Social History Narrative    Not on file       Review of patient's allergies indicates:  No Known Allergies    Current Outpatient Medications:     ALPRAZolam (XANAX) 0.25 MG tablet, Take 1 tablet (0.25 mg total) by mouth daily as needed for Anxiety (Take 30 mins to 1 hour prior to MRI)., Disp: 2 tablet, Rfl:  0    aspirin 81 MG Chew, Take 81 mg by mouth once daily., Disp: , Rfl:     atorvastatin (LIPITOR) 10 MG tablet, Take 1 tablet (10 mg total) by mouth nightly., Disp: 90 tablet, Rfl: 3    azelastine-fluticasone (DYMISTA) 137-50 mcg/spray Spry nassal spray, 1 spray by Each Nostril route 2 (two) times daily., Disp: 23 g, Rfl: 6    brimonidine 0.15 % OPTH DROP (ALPHAGAN) 0.15 % ophthalmic solution, INSTILL 1 DROP IN BOTH EYES BID, Disp: , Rfl: 0    donepezil (ARICEPT) 10 MG tablet, Take 10 mg by mouth once daily. , Disp: , Rfl: 1    dorzolamide (TRUSOPT) 2 % ophthalmic solution, Place 1 drop into both eyes 2 (two) times daily., Disp: , Rfl:     memantine (NAMENDA XR) 28 mg CSpX, Take 28 mg by mouth once daily. , Disp: , Rfl: 1    montelukast (SINGULAIR) 10 mg tablet, Take 1 tablet (10 mg total) by mouth daily as needed., Disp: 90 tablet, Rfl: 2    multivitamin (ONE DAILY MULTIVITAMIN) per tablet, Take 1 tablet by mouth once daily., Disp: , Rfl:     travoprost, benzalkonium, (TRAVATAN) 0.004 % ophthalmic solution, Place 1 drop into both eyes every evening., Disp: , Rfl:     diclofenac sodium (VOLTAREN) 1 % Gel, Apply 4 g topically 3 (three) times daily., Disp: 2 Tube, Rfl: 6      Review of Systems  No nausea, vomiting, fevers, Chills , contipation, diarrhea or sweats    Physical Exam:      Vitals:    10/27/20 0744   BP: (!) 151/96     alert and oriented ×4 follows commands answers all questions appropriately,affect wnl  Manual muscle test 5 out of 5 except for left HI and opponens pollicis on left hand (baseline from nerve injury as a child) sensation to light touch grossly intact  -CMC provocation  Nl gait  Full cervical ROM  No C/C/E  No muscular atrophy- including hypothenar and thenar eminence     Assessment:  Mod CTS  Mild CMC OA   Cervical spondylosis w. Severe neuroforaminal narrowing at multiple levels     Plan:  He declined referral to ortho. He is not interested in surgery as he does not have a lot of  pain. He is aggreable in wearing a carpal tunnel splint at night.  I have also issued a prescription of voltaren gel for him to use for his hand OA  rtc prn

## 2021-08-11 PROBLEM — G56.01 CARPAL TUNNEL SYNDROME, RIGHT: Status: ACTIVE | Noted: 2021-08-11

## 2022-03-23 ENCOUNTER — PATIENT OUTREACH (OUTPATIENT)
Dept: ADMINISTRATIVE | Facility: HOSPITAL | Age: 67
End: 2022-03-23
Payer: MEDICARE

## 2022-03-23 NOTE — PROGRESS NOTES
[facility] CMS chart audits, COLON CANCER SCREENING. Chart review completed for  test overdue (mammograms, Colonoscopies, pap smears, DM labs, and/or EYE EXAMs)      Care Everywhere and media, updates requested and reviewed.        HM updated with external colonoscopy report from media.

## 2022-07-07 NOTE — TELEPHONE ENCOUNTER
Returned call. Appointment scheduled.     ----- Message from Denice Saavedra sent at 9/4/2020 10:53 AM CDT -----  Regarding: PT  Contact: PT  PT called to schedule an appointment, has a referral in his chart. Please call back     Callback: [phone]       7

## 2023-04-18 ENCOUNTER — HOSPITAL ENCOUNTER (OUTPATIENT)
Dept: RADIOLOGY | Facility: HOSPITAL | Age: 68
Discharge: HOME | End: 2023-04-18
Payer: MEDICARE

## 2023-04-18 ENCOUNTER — HOSPITAL ENCOUNTER (OUTPATIENT)
Dept: RADIOLOGY | Facility: HOSPITAL | Age: 68
Discharge: HOME | End: 2023-04-18
Attending: RADIOLOGY
Payer: MEDICARE

## 2023-04-18 DIAGNOSIS — R29.90 NEUROLOGICAL SYMPTOMS: ICD-10-CM

## 2023-04-18 DIAGNOSIS — S00.259A METAL FOREIGN BODY IN EYE REGION: ICD-10-CM

## 2023-04-18 PROCEDURE — 70551 MRI BRAIN STEM W/O DYE: CPT | Mod: 26,,, | Performed by: RADIOLOGY

## 2023-04-18 PROCEDURE — 70551 MRI BRAIN STEM W/O DYE: CPT | Mod: TC

## 2023-04-18 PROCEDURE — 70551 MRI BRAIN WITHOUT CONTRAST: ICD-10-PCS | Mod: 26,,, | Performed by: RADIOLOGY

## 2023-06-01 PROBLEM — I10 PRIMARY HYPERTENSION: Status: ACTIVE | Noted: 2023-06-01

## 2024-02-13 ENCOUNTER — ANESTHESIA EVENT (OUTPATIENT)
Dept: SURGERY | Facility: HOSPITAL | Age: 69
End: 2024-02-13
Payer: MEDICARE

## 2024-02-13 NOTE — ANESTHESIA PREPROCEDURE EVALUATION
02/13/2024  David Duval Jr. is a 68 y.o., male for  COLONOSCOPY      has a past surgical history that includes Colonoscopy (3/15/2013) and Inguinal hernia repair (Right, 07/22/2021).       Pre-op Assessment    I have reviewed the Patient Summary Reports.     I have reviewed the Nursing Notes. I have reviewed the NPO Status.   I have reviewed the Medications.     Review of Systems  Anesthesia Hx:             Denies Family Hx of Anesthesia complications.    Denies Personal Hx of Anesthesia complications.                    Social:  Non-Smoker       Cardiovascular:     Hypertension           hyperlipidemia                             Pulmonary:        Sleep Apnea                Hepatic/GI:     GERD             Musculoskeletal:  Arthritis               Neurological:    Neuromuscular Disease,       Right carpal tunnel syndrome  Memory difficulties                                Physical Exam  General: Well nourished, Cooperative, Alert and Oriented    Airway:  Mallampati: II   Mouth Opening: Normal  TM Distance: Normal  Tongue: Normal  Neck ROM: Normal ROM    Dental:  Intact    Chest/Lungs:  Clear to auscultation, Normal Respiratory Rate    Heart:  Rate: Normal  Rhythm: Regular Rhythm        Anesthesia Plan  Type of Anesthesia, risks & benefits discussed:    Anesthesia Type: MAC  Intra-op Monitoring Plan: Standard ASA Monitors  Post Op Pain Control Plan: IV/PO Opioids PRN  Induction:  IV  Informed Consent: Informed consent signed with the Patient and all parties understand the risks and agree with anesthesia plan.  All questions answered.   ASA Score: 2  Day of Surgery Review of History & Physical: H&P Update referred to the surgeon/provider.    Ready For Surgery From Anesthesia Perspective.     .

## 2024-02-16 ENCOUNTER — HOSPITAL ENCOUNTER (OUTPATIENT)
Facility: HOSPITAL | Age: 69
Discharge: HOME | End: 2024-02-16
Attending: INTERNAL MEDICINE | Admitting: INTERNAL MEDICINE
Payer: MEDICARE

## 2024-02-16 ENCOUNTER — ANESTHESIA (OUTPATIENT)
Dept: SURGERY | Facility: HOSPITAL | Age: 69
End: 2024-02-16
Payer: MEDICARE

## 2024-02-16 VITALS
BODY MASS INDEX: 34.07 KG/M2 | OXYGEN SATURATION: 99 % | SYSTOLIC BLOOD PRESSURE: 140 MMHG | WEIGHT: 230 LBS | TEMPERATURE: 97 F | DIASTOLIC BLOOD PRESSURE: 96 MMHG | RESPIRATION RATE: 20 BRPM | HEART RATE: 81 BPM | HEIGHT: 69 IN

## 2024-02-16 PROCEDURE — 63600175 PHARM REV CODE 636 W HCPCS: Performed by: NURSE ANESTHETIST, CERTIFIED REGISTERED

## 2024-02-16 PROCEDURE — G0121 COLON CA SCRN NOT HI RSK IND: HCPCS | Performed by: INTERNAL MEDICINE

## 2024-02-16 PROCEDURE — 63600175 PHARM REV CODE 636 W HCPCS: Performed by: SPECIALIST

## 2024-02-16 PROCEDURE — D9220A PRA ANESTHESIA: Mod: ,,, | Performed by: NURSE ANESTHETIST, CERTIFIED REGISTERED

## 2024-02-16 PROCEDURE — 37000009 HC ANESTHESIA EA ADD 15 MINS: Performed by: INTERNAL MEDICINE

## 2024-02-16 PROCEDURE — 25000003 PHARM REV CODE 250: Performed by: NURSE ANESTHETIST, CERTIFIED REGISTERED

## 2024-02-16 PROCEDURE — 37000008 HC ANESTHESIA 1ST 15 MINUTES: Performed by: INTERNAL MEDICINE

## 2024-02-16 RX ORDER — PROPOFOL 10 MG/ML
VIAL (ML) INTRAVENOUS
Status: DISCONTINUED | OUTPATIENT
Start: 2024-02-16 | End: 2024-02-16

## 2024-02-16 RX ORDER — LIDOCAINE HYDROCHLORIDE 10 MG/ML
1 INJECTION, SOLUTION EPIDURAL; INFILTRATION; INTRACAUDAL; PERINEURAL ONCE
Status: DISCONTINUED | OUTPATIENT
Start: 2024-02-16 | End: 2024-02-16 | Stop reason: HOSPADM

## 2024-02-16 RX ORDER — LIDOCAINE HYDROCHLORIDE 20 MG/ML
INJECTION, SOLUTION EPIDURAL; INFILTRATION; INTRACAUDAL; PERINEURAL
Status: DISCONTINUED | OUTPATIENT
Start: 2024-02-16 | End: 2024-02-16

## 2024-02-16 RX ORDER — HYDROMORPHONE HYDROCHLORIDE 1 MG/ML
0.5 INJECTION, SOLUTION INTRAMUSCULAR; INTRAVENOUS; SUBCUTANEOUS EVERY 5 MIN PRN
Status: DISCONTINUED | OUTPATIENT
Start: 2024-02-16 | End: 2024-02-16 | Stop reason: HOSPADM

## 2024-02-16 RX ORDER — ONDANSETRON HYDROCHLORIDE 2 MG/ML
4 INJECTION, SOLUTION INTRAVENOUS DAILY PRN
Status: DISCONTINUED | OUTPATIENT
Start: 2024-02-16 | End: 2024-02-16 | Stop reason: HOSPADM

## 2024-02-16 RX ORDER — MEPERIDINE HYDROCHLORIDE 50 MG/ML
12.5 INJECTION INTRAMUSCULAR; INTRAVENOUS; SUBCUTANEOUS EVERY 10 MIN PRN
Status: DISCONTINUED | OUTPATIENT
Start: 2024-02-16 | End: 2024-02-16 | Stop reason: HOSPADM

## 2024-02-16 RX ORDER — SODIUM CHLORIDE, SODIUM LACTATE, POTASSIUM CHLORIDE, CALCIUM CHLORIDE 600; 310; 30; 20 MG/100ML; MG/100ML; MG/100ML; MG/100ML
125 INJECTION, SOLUTION INTRAVENOUS CONTINUOUS
Status: DISCONTINUED | OUTPATIENT
Start: 2024-02-16 | End: 2024-02-16 | Stop reason: HOSPADM

## 2024-02-16 RX ORDER — SODIUM CHLORIDE, SODIUM LACTATE, POTASSIUM CHLORIDE, CALCIUM CHLORIDE 600; 310; 30; 20 MG/100ML; MG/100ML; MG/100ML; MG/100ML
INJECTION, SOLUTION INTRAVENOUS CONTINUOUS
Status: DISCONTINUED | OUTPATIENT
Start: 2024-02-16 | End: 2024-02-16 | Stop reason: HOSPADM

## 2024-02-16 RX ORDER — OXYCODONE HYDROCHLORIDE 5 MG/1
5 TABLET ORAL ONCE AS NEEDED
Status: DISCONTINUED | OUTPATIENT
Start: 2024-02-16 | End: 2024-02-16 | Stop reason: HOSPADM

## 2024-02-16 RX ADMIN — GLYCOPYRROLATE 0.4 MG: 0.2 INJECTION INTRAMUSCULAR; INTRAVENOUS at 10:02

## 2024-02-16 RX ADMIN — SODIUM CHLORIDE, POTASSIUM CHLORIDE, SODIUM LACTATE AND CALCIUM CHLORIDE: 600; 310; 30; 20 INJECTION, SOLUTION INTRAVENOUS at 11:02

## 2024-02-16 RX ADMIN — PROPOFOL 50 MG: 10 INJECTION, EMULSION INTRAVENOUS at 10:02

## 2024-02-16 RX ADMIN — PROPOFOL 50 MG: 10 INJECTION, EMULSION INTRAVENOUS at 11:02

## 2024-02-16 RX ADMIN — SODIUM CHLORIDE, POTASSIUM CHLORIDE, SODIUM LACTATE AND CALCIUM CHLORIDE: 600; 310; 30; 20 INJECTION, SOLUTION INTRAVENOUS at 10:02

## 2024-02-16 RX ADMIN — LIDOCAINE HYDROCHLORIDE 50 MG: 20 INJECTION, SOLUTION EPIDURAL; INFILTRATION; INTRACAUDAL; PERINEURAL at 10:02

## 2024-02-16 NOTE — DISCHARGE INSTRUCTIONS

## 2024-02-16 NOTE — H&P
H&P update:  Patient presents this morning for endoscopy as scheduled.  I have reviewed the case with the patient and there are no changes since the clinic visit.  We have discussed risk and benefit associated with endoscopy and patient wishes to proceed.  ------------------------------------------------------------------------------------------------------------    Impressions:   1. Screening for colon cancer       Recommendations:  --Colonoscopy indications: Screening;  The patient is an appropriate candidate for Colonoscopy. I discussed with the patient the bowel prep as well as the benefits, risks, indications and, alternatives to Colonoscopy including the risks of perforation and bleeding among other risks. The patient understands and wishes to proceed.     --Follow up in GI Clinic in 2 weeks, s/p procedure.    Orders Placed This Encounter   Medications   sodium, potassium, & magnesium sulfate (SUPREP BOWEL PREP KIT) 17.5-3.13-1.6 gram Recon Soln   Sig: 3 quarts take split dose as directed: Drink 16 ounces of milk solution x1, then drink 32 ounces of water over 1 hour evening before procedure; on day of procedure in 10 to 12 hours after first dose, drink 16 ounces of mixed elation x1, then drink 32 ounces of water, complete regimen at least 2 hours before procedure. Indications: emptying of the bowel   Dispense: 354 mL   Refill: 0     Orders Placed This Encounter   Procedures   ASC Colonoscopy     ---------------------------------------------------------------------    Chief complaint: Screening colonoscopy    History of present illness: This is a new patient, a 68-year-old male referred by his PCP, Dr. Choi, in consultation for screening colonoscopy. He denies a family history of colon cancer. Previous colonoscopy in March 2013 with no polyps or malignancy noted. He has history of acid indigestion with reflux and uses Pepcid infrequently as needed for indigestion. He reports normal bowel habits with daily  use of milk of magnesium that he started when his Parkinson's medications were initiated and caused him some constipation.  He denies Red flags: Persistent rectal bleeding for 6 weeks without anal symptoms (>60 yrs). Change in bowel habit to looser stools/increased frequency for 6 weeks (>60 yrs). Change in bowel habit to looser stools/increased frequency and rectal bleeding (>60 yrs). Palpable right iliac fossa mass. Palpable rectal mass (intraluminal). Unexplained iron deficiency anemia.      Review of systems: 12 point review of systems was negative except as documented above.    Outpatient medications: Personally reviewed and documented in the EHR  Current Outpatient Medications   Medication Instructions   aspirin 81 mg, Sublingual   atorvastatin (LIPITOR) 10 MG tablet TAKE 1 TABLET(10 MG) BY MOUTH EVERY EVENING   famotidine (PEPCID) 20 mg, Sublingual   losartan (COZAAR) 50 mg, Sublingual   memantine 28 mg, Sublingual   multivitamin (THERAGRAN) 1 tablet, Sublingual, Daily   pramipexole (MIRAPEX) 0.75 mg, Oral   sodium, potassium, & magnesium sulfate (SUPREP BOWEL PREP KIT) 17.5-3.13-1.6 gram Recon Soln 3 quarts take split dose as directed: Drink 16 ounces of milk solution x1, then drink 32 ounces of water over 1 hour evening before procedure; on day of procedure in 10 to 12 hours after first dose, drink 16 ounces of mixed elation x1, then drink 32 ounces of water, complete regimen at least 2 hours before procedure.       Past Medical History: Personally reviewed and documented in the EHR  History reviewed. No pertinent past medical history.      Family history:   Family History   Problem Relation Age of Onset   Colon cancer Neg Hx         Physical examination:   Vital Signs: Current vital signs reviewed and documented above  General Appearance: Well-appearing. Not acutely ill.  Head: Normocephalic.   Eyes: No scleral icterus. No scleral injection. No conjunctival pallor.   Lungs: Respiration rhythm and depth was  "normal.  Cardiac: Regular rate and rhythm. No murmur.   Abdomen: Abdomen was not distended. Abdominal palpation revealed no tenderness. Abdominal auscultation revealed positive bowel sounds.  Neurological: Level of consciousness was normal. Speech was normal. Fine tremor noted.  Skin: General appearance was normal. Color and pigmentation were normal. No skin lesions.    Laboratory: Personally reviewed  No results found for: "WBC", "HGB", "HCT", "MCV", "MCH", "MCHC", "RDW", "LABPLAT" No results found for: "NA", "K", "CL", "CO2", "BUN", "CRE", "GLU", "AGAP", "EGFR", "EGFRAA", "AST", "ALT", "TP", "TBIL"     Radiology: Personally reviewed    Prior Endoscopy:  --Colonoscopy, 3/25/2013, Niels Dawson: Minimal sigmoid diverticulosis. No evidence of polyps or malignancy.  --EGD, 3/25/2013, Niels Dawson: Gastritis, rule out H. pylori.   "

## 2024-02-16 NOTE — ANESTHESIA POSTPROCEDURE EVALUATION
Anesthesia Post Evaluation    Patient: David Duval JrVaishali    Procedure(s) Performed: Procedure(s) (LRB):  COLONOSCOPY (N/A)    Final Anesthesia Type: MAC      Patient location during evaluation: PACU  Patient participation: Yes- Able to Participate  Level of consciousness: awake and alert and oriented  Post-procedure vital signs: reviewed and stable  Pain management: adequate  Airway patency: patent    PONV status at discharge: No PONV  Anesthetic complications: no      Cardiovascular status: blood pressure returned to baseline and hemodynamically stable  Respiratory status: spontaneous ventilation and room air  Hydration status: euvolemic  Follow-up not needed.              Vitals Value Taken Time   /77 02/16/24 1112   Temp 98 02/16/24 1114   Pulse 80 02/16/24 1114   Resp 30 02/16/24 1114   SpO2 97 % 02/16/24 1114   Vitals shown include unvalidated device data.      No case tracking events are documented in the log.      Pain/Ivone Score: Ivone Score: 8 (2/16/2024 11:10 AM)

## 2024-02-16 NOTE — [employer]ATION PATIENT INSTRUCTIONS
Discharge Summary/Instructions after an Endoscopic Procedure  Patient Name: David Duval  Patient MRN: [id number]  Patient YOB: 1955 Friday, February 16, 2024  Moises Enciso MD  RESTRICTIONS:  During your procedure today, you received medications for sedation.  These   medications may affect your judgment, balance and coordination.  Therefore,   for 24 hours, you have the following restrictions:   - DO NOT drive a car, operate machinery, make legal/financial decisions,   sign important papers or drink alcohol.    ACTIVITY:  Today: no heavy lifting, straining or running due to procedural   sedation/anesthesia.  The following day: return to full activity including work.  DIET:  Eat and drink normally unless instructed otherwise.     TREATMENT FOR COMMON SIDE EFFECTS:  - Mild abdominal pain, nausea, belching, bloating or excessive gas:  rest,   eat lightly and use a heating pad.  - Sore Throat: treat with throat lozenges and/or gargle with warm salt   water.  - Because air was used during the procedure, expelling large amounts of air   from your rectum or belching is normal.  - If a bowel prep was taken, you may not have a bowel movement for 1-3 days.    This is normal.  SYMPTOMS TO WATCH FOR AND REPORT TO YOUR PHYSICIAN:  1. Abdominal pain or bloating, other than gas cramps.  2. Chest pain.  3. Back pain.  4. Signs of infection such as: chills or fever occurring within 24 hours   after the procedure.  5. Rectal bleeding, which would show as bright red, maroon, or black stools.   (A tablespoon of blood from the rectum is not serious, especially if   hemorrhoids are present.)  6. Vomiting.  7. Weakness or dizziness.  GO DIRECTLY TO THE NEAREST EMERGENCY ROOM IF YOU HAVE ANY OF THE FOLLOWING:      Difficulty breathing              Chills and/or fever over 101 F   Persistent vomiting and/or vomiting blood   Severe abdominal pain   Severe chest pain   Black, tarry stools   Bleeding- more than one  tablespoon   Any other symptom or condition that you feel may need urgent attention  Your doctor recommends these additional instructions:  If any biopsies were taken, your doctors clinic will contact you in 1 to 2   weeks with any results.  - Discharge patient to home (ambulatory).   - Patient has a contact number available for emergencies.  The signs and   symptoms of potential delayed complications were discussed with the   patient.  Return to normal activities tomorrow.  Written discharge   instructions were provided to the patient.   - Resume previous diet.   - Continue present medications.   - Return to primary care physician as previously scheduled.   - Repeat colonoscopy in 10 years for screening purposes.   - Return to GI clinic PRN.   - Return to primary care physician as previously scheduled.  For questions, problems or results please call your physician - Moises Enciso MD at Work:  [phone].  [facility] EMERGENCY ROOM PHONE NUMBER: [phone]  IF A COMPLICATION OR EMERGENCY SITUATION ARISES AND YOU ARE UNABLE TO REACH   YOUR PHYSICIAN - GO DIRECTLY TO THE EMERGENCY ROOM.  Moises Enciso MD  2/16/2024 11:09:07 AM  This report has been verified and signed electronically.  Dear patient,  As a result of recent federal legislation (The Federal Cures Act), you may   receive lab or pathology results from your procedure in your [facility]   account before your physician is able to contact you. Your physician or   their representative will relay the results to you with their   recommendations at their soonest availability.  Thank you,  [employer]ATION

## 2024-02-16 NOTE — TRANSFER OF CARE
"Anesthesia Transfer of Care Note    Patient: David Duval Jr.    Procedure(s) Performed: Procedure(s) (LRB):  COLONOSCOPY (N/A)    Patient location: PACU    Anesthesia Type: general    Transport from OR: Transported from OR on room air with adequate spontaneous ventilation    Post pain: adequate analgesia    Post assessment: no apparent anesthetic complications and tolerated procedure well    Post vital signs: stable    Level of consciousness: awake, alert and oriented    Nausea/Vomiting: no nausea/vomiting    Complications: none    Transfer of care protocol was followed      Last vitals: Visit Vitals  BP (!) 146/90 (BP Location: Right arm, Patient Position: Lying)   Pulse 82   Temp 36.6 °C (97.8 °F) (Oral)   Resp 12   Ht 5' 9" (1.753 m)   Wt 104.3 kg (230 lb)   SpO2 97%   BMI 33.97 kg/m²     "

## (undated) DEVICE — CANISTER SUCTION RIGID 1200CC

## (undated) DEVICE — KIT ENDO CARRY-ON PROC 100310

## (undated) DEVICE — DEVICE ENDOCUFF VISION LG 11.2

## (undated) DEVICE — SOL IRRI STRL WATER 1000ML

## (undated) DEVICE — GLOVE SURG ULTRA TOUCH 7.5